# Patient Record
Sex: MALE | Race: WHITE | NOT HISPANIC OR LATINO | Employment: OTHER | URBAN - METROPOLITAN AREA
[De-identification: names, ages, dates, MRNs, and addresses within clinical notes are randomized per-mention and may not be internally consistent; named-entity substitution may affect disease eponyms.]

---

## 2017-01-01 ENCOUNTER — OFFICE VISIT (OUTPATIENT)
Dept: CARDIOLOGY | Facility: CLINIC | Age: 82
End: 2017-01-01

## 2017-01-01 VITALS
BODY MASS INDEX: 19.76 KG/M2 | HEART RATE: 49 BPM | HEIGHT: 68 IN | DIASTOLIC BLOOD PRESSURE: 76 MMHG | SYSTOLIC BLOOD PRESSURE: 116 MMHG | WEIGHT: 130.4 LBS

## 2017-01-01 DIAGNOSIS — R55 SYNCOPE, UNSPECIFIED SYNCOPE TYPE: Primary | ICD-10-CM

## 2017-01-01 DIAGNOSIS — I45.10 RBBB: ICD-10-CM

## 2017-01-01 DIAGNOSIS — R00.1 BRADYCARDIA: ICD-10-CM

## 2017-01-01 PROCEDURE — 93291 INTERROG DEV EVAL SCRMS IP: CPT | Performed by: PHYSICIAN ASSISTANT

## 2017-01-01 PROCEDURE — 99213 OFFICE O/P EST LOW 20 MIN: CPT | Performed by: PHYSICIAN ASSISTANT

## 2017-01-03 ENCOUNTER — OFFICE VISIT (OUTPATIENT)
Dept: CARDIOLOGY | Facility: CLINIC | Age: 82
End: 2017-01-03

## 2017-01-03 VITALS
DIASTOLIC BLOOD PRESSURE: 86 MMHG | HEIGHT: 67 IN | HEART RATE: 58 BPM | WEIGHT: 139 LBS | BODY MASS INDEX: 21.82 KG/M2 | SYSTOLIC BLOOD PRESSURE: 132 MMHG

## 2017-01-03 DIAGNOSIS — I10 ESSENTIAL HYPERTENSION: Chronic | ICD-10-CM

## 2017-01-03 DIAGNOSIS — I45.10 RBBB: ICD-10-CM

## 2017-01-03 DIAGNOSIS — R55 SYNCOPE, UNSPECIFIED SYNCOPE TYPE: Primary | ICD-10-CM

## 2017-01-03 DIAGNOSIS — Z45.09 ENCOUNTER FOR LOOP RECORDER CHECK: ICD-10-CM

## 2017-01-03 PROCEDURE — 99212 OFFICE O/P EST SF 10 MIN: CPT | Performed by: INTERNAL MEDICINE

## 2017-01-03 PROCEDURE — 93291 INTERROG DEV EVAL SCRMS IP: CPT | Performed by: INTERNAL MEDICINE

## 2017-01-03 RX ORDER — LISINOPRIL 10 MG/1
20 TABLET ORAL DAILY
COMMUNITY

## 2017-01-03 RX ORDER — HYDROCODONE BITARTRATE AND ACETAMINOPHEN 10; 325 MG/1; MG/1
1 TABLET ORAL EVERY 8 HOURS PRN
COMMUNITY

## 2017-01-03 NOTE — PROGRESS NOTES
Chief Complaint: Syncope    History of Present Illness:    The stable established chief complaint has not recurred since loop recorder implant 3 months ago. Only new health event was this past Sunday of HA for which he went to [facility] and was found to be hypertensive with SBP > 200 mmHg.    Patient Active Problem List   Diagnosis   • Syncope   • Chronic hip pain - right hip pain   • Chronic back pain   • Hypertension   • BPH (benign prostatic hyperplasia)   • Anxiety and depression   • Constipation   • Hyperlipidemia   • Parkinson's disease   • Bradycardia   • Rib pain on left side   • RBBB          Current Outpatient Prescriptions:   •  aspirin 81 MG EC tablet, Take 81 mg by mouth Daily., Disp: , Rfl:   •  carbidopa-levodopa (SINEMET)  MG per tablet, Take 1 tablet by mouth 4 (Four) Times a Day., Disp: , Rfl:   •  finasteride (PROSCAR) 5 MG tablet, Take 5 mg by mouth Daily., Disp: , Rfl:   •  gabapentin (NEURONTIN) 600 MG tablet, Take 600 mg by mouth Daily., Disp: , Rfl:   •  gabapentin (NEURONTIN) 800 MG tablet, Take 800 mg by mouth Daily. hs, Disp: , Rfl:   •  HYDROcodone-acetaminophen (NORCO)  MG per tablet, Take 1 tablet by mouth Every 8 (Eight) Hours As Needed for moderate pain (4-6)., Disp: , Rfl:   •  lisinopril (PRINIVIL,ZESTRIL) 10 MG tablet, Take 10 mg by mouth Daily., Disp: , Rfl:   •  PARoxetine (PAXIL) 40 MG tablet, Take 40 mg by mouth Every Morning., Disp: , Rfl:   •  potassium chloride (K-DUR,KLOR-CON) 10 MEQ CR tablet, Take 10 mEq by mouth 2 (Two) Times a Day., Disp: , Rfl:   •  sennosides-docusate sodium (SENOKOT-S) 8.6-50 MG tablet, Take 1 tablet by mouth Daily., Disp: , Rfl:   •  simvastatin (ZOCOR) 40 MG tablet, Take 40 mg by mouth Every Night., Disp: , Rfl:   •  tamsulosin (FLOMAX) 0.4 MG capsule 24 hr capsule, Take 1 capsule by mouth Every Night., Disp: , Rfl:   Allergies   Allergen Reactions   • Promethazine Hcl Rash   • Valium [Diazepam] Rash        ROS:    Denies chest pain,  tightness, palpitations, CELESTE, PND, or edema      Vitals:    01/03/17 1039   BP: 132/86   Pulse: 58   R:                       15       Physical Exam   Pulmonary/Chest:            Lungs: CTA, no wheezing, equal air entry bilaterally, resonant to percussion  Cor: RRR, physiologic S1, S2, no rubs, gallops, murmurs or thrills  Ext: warm, negative edema     Diagnosis Plan   1. Syncope, unspecified syncope type,with no significant bradycardia on loop FU in [place] with Dr. Gardner for future loop recorder checks   2. Essential hypertension recently uncontrolled (I don't manage this issue) Double lisinopril, and follow-up in near term with primary MD for management   3. Encounter for loop recorder check     4. RBBB              Device Check (Loop recorder)    Company MDT    Episodes Undercounting of R waves    Reprogramming Sensitivity to 0.1 mV

## 2017-04-11 ENCOUNTER — OFFICE VISIT (OUTPATIENT)
Dept: CARDIOLOGY | Facility: CLINIC | Age: 82
End: 2017-04-11

## 2017-04-11 VITALS
BODY MASS INDEX: 21.1 KG/M2 | HEIGHT: 68 IN | DIASTOLIC BLOOD PRESSURE: 64 MMHG | HEART RATE: 59 BPM | WEIGHT: 139.2 LBS | SYSTOLIC BLOOD PRESSURE: 110 MMHG

## 2017-04-11 DIAGNOSIS — Z45.09 ENCOUNTER FOR LOOP RECORDER CHECK: ICD-10-CM

## 2017-04-11 DIAGNOSIS — R55 SYNCOPE, UNSPECIFIED SYNCOPE TYPE: Primary | ICD-10-CM

## 2017-04-11 PROCEDURE — 99212 OFFICE O/P EST SF 10 MIN: CPT | Performed by: INTERNAL MEDICINE

## 2017-04-11 PROCEDURE — 93291 INTERROG DEV EVAL SCRMS IP: CPT | Performed by: INTERNAL MEDICINE

## 2017-04-11 NOTE — PROGRESS NOTES
"  Chief Complaint: Syncope    History of Present Illness:    The stable established chief complaint has not recurred since last loop recorder check.    Patient Active Problem List   Diagnosis   • Syncope   • Chronic hip pain - right hip pain   • Chronic back pain   • Hypertension   • BPH (benign prostatic hyperplasia)   • Anxiety and depression   • Constipation   • Hyperlipidemia   • Parkinson's disease   • Bradycardia   • Rib pain on left side   • RBBB          Current Outpatient Prescriptions:   •  aspirin 81 MG EC tablet, Take 81 mg by mouth Daily., Disp: , Rfl:   •  carbidopa-levodopa (SINEMET)  MG per tablet, Take 1 tablet by mouth 4 (Four) Times a Day., Disp: , Rfl:   •  finasteride (PROSCAR) 5 MG tablet, Take 5 mg by mouth Daily., Disp: , Rfl:   •  gabapentin (NEURONTIN) 600 MG tablet, Take 600 mg by mouth Daily., Disp: , Rfl:   •  gabapentin (NEURONTIN) 800 MG tablet, Take 800 mg by mouth Daily. hs, Disp: , Rfl:   •  HYDROcodone-acetaminophen (NORCO)  MG per tablet, Take 1 tablet by mouth Every 8 (Eight) Hours As Needed for moderate pain (4-6)., Disp: , Rfl:   •  lisinopril (PRINIVIL,ZESTRIL) 10 MG tablet, Take 20 mg by mouth Daily., Disp: , Rfl:   •  PARoxetine (PAXIL) 40 MG tablet, Take 40 mg by mouth Every Morning., Disp: , Rfl:   •  potassium chloride (K-DUR,KLOR-CON) 10 MEQ CR tablet, Take 10 mEq by mouth 2 (Two) Times a Day., Disp: , Rfl:   •  sennosides-docusate sodium (SENOKOT-S) 8.6-50 MG tablet, Take 1 tablet by mouth As Needed., Disp: , Rfl:   •  simvastatin (ZOCOR) 40 MG tablet, Take 40 mg by mouth Every Night., Disp: , Rfl:   •  tamsulosin (FLOMAX) 0.4 MG capsule 24 hr capsule, Take 1 capsule by mouth Every Night., Disp: , Rfl:    Allergies   Allergen Reactions   • Promethazine Hcl Rash   • Valium [Diazepam] Rash        ROS:    Denies chest pain, tightness, palpitations, CELESTE, PND, or edema      /64 (BP Location: Right arm, Patient Position: Sitting)  Pulse 59  Ht 68\" (172.7 " cm)  Wt 139 lb 3.2 oz (63.1 kg)  BMI 21.17 kg/m2.    Physical Exam:    Lungs: CTA, no wheezing, equal air entry bilaterally, resonant to percussion  Cor: RRR, physiologic S1, S2, no rubs, gallops, murmurs or thrills  Ext: warm, negative edema    Procedures     Loop recorder check: No bradycardia issues, asymptomatic NSVT     Diagnosis Plan   1. Syncope, unspecified syncope type     2. Encounter for loop recorder check     Continue current Rx, and follow-up 6 months for loop check

## 2017-10-24 NOTE — PROGRESS NOTES
Subjective:   Tio Machado  1/28/1926  [phone]      10/24/2017    [facility] CARDIOLOGY    No Known Provider  [facility] [address]        Patient ID: Tio Machado is a 89 y.o. male.    Chief Complaint: Syncope    Problem List:  1. Syncope  2. RBBB/Bradycardia/Palpitations  3. Labile BP  4. HLD  5. BPH  6. Parkinson's disease      Allergies   Allergen Reactions   • Promethazine Hcl Rash   • Valium [Diazepam] Rash       Current Outpatient Prescriptions:   •  aspirin 81 MG EC tablet, Take 81 mg by mouth Daily., Disp: , Rfl:   •  carbidopa-levodopa (SINEMET)  MG per tablet, Take 1 tablet by mouth 4 (Four) Times a Day., Disp: , Rfl:   •  finasteride (PROSCAR) 5 MG tablet, Take 5 mg by mouth Daily., Disp: , Rfl:   •  gabapentin (NEURONTIN) 600 MG tablet, Take 600 mg by mouth Daily., Disp: , Rfl:   •  gabapentin (NEURONTIN) 800 MG tablet, Take 800 mg by mouth Daily. hs, Disp: , Rfl:   •  HYDROcodone-acetaminophen (NORCO)  MG per tablet, Take 1 tablet by mouth Every 8 (Eight) Hours As Needed for moderate pain (4-6)., Disp: , Rfl:   •  lisinopril (PRINIVIL,ZESTRIL) 10 MG tablet, Take 20 mg by mouth Daily., Disp: , Rfl:   •  PARoxetine (PAXIL) 40 MG tablet, Take 40 mg by mouth Every Morning., Disp: , Rfl:   •  potassium chloride (K-DUR,KLOR-CON) 10 MEQ CR tablet, Take 10 mEq by mouth 2 (Two) Times a Day., Disp: , Rfl:   •  sennosides-docusate sodium (SENOKOT-S) 8.6-50 MG tablet, Take 1 tablet by mouth As Needed., Disp: , Rfl:   •  simvastatin (ZOCOR) 40 MG tablet, Take 40 mg by mouth Every Night., Disp: , Rfl:   •  tamsulosin (FLOMAX) 0.4 MG capsule 24 hr capsule, Take 1 capsule by mouth Every Night., Disp: , Rfl:     History of Present Illness  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  Mr. Machado has had a loop recorder Place and is interrogated today.  He apparently  "was admitted to [facility] 10/13/2017 after he used a prescription for his skin that he had a reaction to he felt some skipped beats and \"rapid heart beats\".  In the emergency room EKG reveals sinus rhythm with occasional PVCs.  His laboratory data is unremarkable.  He denies any episodes of syncope or near-syncope.  He denies any chest pain.  He states his heart beat has always been low since he was a young man.  He states currently he's been feeling well.      The following portions of the patient's history were reviewed and updated as appropriate: allergies, current medications, past family history, past medical history, past social history, past surgical history and problem list.    ROS   14 point ROS negative except as outlined in problem list, HPI and other parts of the note.    Procedures       Objective:       Vitals:    10/24/17 1414   BP: 116/76   BP Location: Right arm   Patient Position: Sitting   Pulse: (!) 49   Weight: 130 lb 6.4 oz (59.1 kg)   Height: 68\" (172.7 cm)       GENERAL: Well-developed, well-nourished patient in no acute distress who appears stated age.  HEENT: Normocephalic, atraumatic, band aids on skin lesions.   NECK: No JVD present at 30°. No carotid bruits auscultated.  LUNGS: Clear to auscultation.  CARDIOVASCULAR: Heart has a regular rate and rhythm. No murmurs, gallops or rubs noted.   SKIN: Pink, warm  Neuro: Nonfocal exam. Gait intact  Ext: Trace edema     The patient's old records including ambulatory rhythm recordings (ECGs, Holter/event monitor) were reviewed and discussed.        Interpretation Summary   · Left ventricular function is normal. Estimated EF = 60%.  · Mild aortic valve regurgitation is present.  · Mild mitral valve regurgitation is present  · Mild tricuspid valve regurgitation is present.  · Left ventricular diastolic dysfunction (grade I) consistent with impaired relaxation               Device Interrogation: Loop: no arrhythmias and no " pauses.     Diagnosis:   1. Asymptomatic bradycardia  2. No recurrent syncope events  3. Loop recorder:  Normal function, no arrhythmias     Assessment & Plan:   Loop recorder interrogation today reveals no sick arrhythmias and no significant bradycardia.  There were episodes of undersensing but this has been adjusted.  There is no episodes of rapid rhythms.  He and his son feel that the majority of this reaction when he was in the hospital in [place] was secondary to medication he was using which is no longer in his system.  He denies any events of near syncope or syncope.  Would like to return for follow-up visit in 6 months or sooner he has any change in symptoms.    MINGO Brooks  10/24/17  2:42 PM

## 2018-01-01 ENCOUNTER — TELEPHONE (OUTPATIENT)
Dept: CARDIOLOGY | Facility: CLINIC | Age: 83
End: 2018-01-01

## 2018-01-01 ENCOUNTER — CLINICAL SUPPORT NO REQUIREMENTS (OUTPATIENT)
Dept: CARDIOLOGY | Facility: CLINIC | Age: 83
End: 2018-01-01

## 2018-01-01 DIAGNOSIS — R00.1 BRADYCARDIA: ICD-10-CM

## 2018-01-01 DIAGNOSIS — R55 SYNCOPE, UNSPECIFIED SYNCOPE TYPE: Primary | ICD-10-CM

## 2018-01-01 DIAGNOSIS — R55 SYNCOPE, UNSPECIFIED SYNCOPE TYPE: ICD-10-CM

## 2018-01-01 PROCEDURE — 93298 REM INTERROG DEV EVAL SCRMS: CPT | Performed by: INTERNAL MEDICINE

## 2018-01-01 PROCEDURE — 93299 PR REM INTERROG ICPMS/SCRMS <30 D TECH REVIEW: CPT | Performed by: INTERNAL MEDICINE
